# Patient Record
Sex: FEMALE | URBAN - NONMETROPOLITAN AREA
[De-identification: names, ages, dates, MRNs, and addresses within clinical notes are randomized per-mention and may not be internally consistent; named-entity substitution may affect disease eponyms.]

---

## 2021-07-06 LAB — ANTIBODY: NORMAL

## 2024-09-23 ENCOUNTER — OFFICE VISIT (OUTPATIENT)
Age: 69
End: 2024-09-23
Payer: MEDICARE

## 2024-09-23 VITALS — OXYGEN SATURATION: 93 % | DIASTOLIC BLOOD PRESSURE: 70 MMHG | SYSTOLIC BLOOD PRESSURE: 126 MMHG | HEART RATE: 100 BPM

## 2024-09-23 DIAGNOSIS — M54.2 NECK PAIN ON LEFT SIDE: Primary | ICD-10-CM

## 2024-09-23 DIAGNOSIS — J34.9 SINUS PROBLEM: ICD-10-CM

## 2024-09-23 PROCEDURE — G8421 BMI NOT CALCULATED: HCPCS | Performed by: PHYSICIAN ASSISTANT

## 2024-09-23 PROCEDURE — G8427 DOCREV CUR MEDS BY ELIG CLIN: HCPCS | Performed by: PHYSICIAN ASSISTANT

## 2024-09-23 PROCEDURE — G8400 PT W/DXA NO RESULTS DOC: HCPCS | Performed by: PHYSICIAN ASSISTANT

## 2024-09-23 PROCEDURE — 3017F COLORECTAL CA SCREEN DOC REV: CPT | Performed by: PHYSICIAN ASSISTANT

## 2024-09-23 PROCEDURE — 99204 OFFICE O/P NEW MOD 45 MIN: CPT | Performed by: PHYSICIAN ASSISTANT

## 2024-09-23 PROCEDURE — 1090F PRES/ABSN URINE INCON ASSESS: CPT | Performed by: PHYSICIAN ASSISTANT

## 2024-09-23 PROCEDURE — 1123F ACP DISCUSS/DSCN MKR DOCD: CPT | Performed by: PHYSICIAN ASSISTANT

## 2024-09-23 PROCEDURE — 4004F PT TOBACCO SCREEN RCVD TLK: CPT | Performed by: PHYSICIAN ASSISTANT

## 2024-09-23 RX ORDER — PREDNISONE 20 MG/1
20 TABLET ORAL DAILY
Qty: 5 TABLET | Refills: 0 | Status: SHIPPED | OUTPATIENT
Start: 2024-09-23 | End: 2024-09-28

## 2024-09-23 RX ORDER — METHOCARBAMOL 500 MG/1
500 TABLET, FILM COATED ORAL 3 TIMES DAILY PRN
Qty: 40 TABLET | Refills: 0 | Status: SHIPPED | OUTPATIENT
Start: 2024-09-23 | End: 2024-10-03

## 2024-09-23 ASSESSMENT — ENCOUNTER SYMPTOMS
SHORTNESS OF BREATH: 0
COUGH: 1
PHOTOPHOBIA: 0
SINUS PAIN: 1
VOMITING: 0
EYE PAIN: 0
EYE DISCHARGE: 0
SINUS PRESSURE: 1
CONSTIPATION: 0
RHINORRHEA: 0
BACK PAIN: 0
EYE REDNESS: 0
BLOOD IN STOOL: 0
ABDOMINAL PAIN: 0
SORE THROAT: 0
DIARRHEA: 0
NAUSEA: 0
WHEEZING: 0
CHEST TIGHTNESS: 0
COLOR CHANGE: 0

## 2024-09-24 ENCOUNTER — TELEPHONE (OUTPATIENT)
Age: 69
End: 2024-09-24

## 2024-09-27 ENCOUNTER — TELEPHONE (OUTPATIENT)
Age: 69
End: 2024-09-27

## 2024-09-30 ENCOUNTER — HOSPITAL ENCOUNTER (OUTPATIENT)
Dept: PHYSICAL THERAPY | Age: 69
Setting detail: THERAPIES SERIES
Discharge: HOME | End: 2024-09-30
Payer: MEDICARE

## 2024-09-30 PROCEDURE — 97162 PT EVAL MOD COMPLEX 30 MIN: CPT

## 2024-09-30 PROCEDURE — 97110 THERAPEUTIC EXERCISES: CPT

## 2024-09-30 ASSESSMENT — PAIN SCALES - GENERAL: PAINLEVEL_OUTOF10: 0

## 2024-09-30 NOTE — PROGRESS NOTES
will rotate neck to 60* to the Left without difficulty                                                    TREATMENT PLAN       Requires PT Follow-Up: Yes    Pt. actively involved in establishing Plan of Care and Goals: Yes  Patient/ Caregiver education and instruction:               Treatment may include any combination of the following:       Frequency / Duration:  Patient to be seen   for   weeks      Eval Complexity:    Decision Making: Medium Complexity  History: PF - neck DJD  Exam: NDI/ ROM  Clinical Presentation: evolving    PT Treatment Completed:      Therapy Time  Individual Time In: 1420       Individual Time Out: 1455  Minutes: 35  Timed Code Treatment Minutes: 15 Minutes     Therapist Signature: DEX Arias PT    Date: 9/30/2024     I certify that the above Therapy Services are being furnished while the patient is under my care. I agree with the treatment plan and certify that this therapy is necessary.      Physician's Signature:  ___________________________   Date:_______                                                                   Nic Ortez PA        Physician Comments: _______________________________________________    Please sign and return to [facility].  Please fax to the location listed below. THANK YOU for this referral!    [facility]  [address]  [facility] [address]  Dept: [phone]  Loc: [phone]       POC NOTE

## 2024-09-30 NOTE — PLAN OF CARE
Outpatient Physical Therapy           [place]           [] Phone: [phone]   Fax: [phone]  [place]           [x] Phone: [phone]   Fax: [phone]     To: Nic Ortez PA     From: DEX Arias PT,      Patient: Marialuisa Dailey       : 1955  Diagnosis: Neck pain on left side [M54.2] Diagnosis: neck pain  Treatment Diagnosis: neck stiffness  Date: 2024    Physical Therapy Certification/Re-Certification Form    The following patient has been evaluated for physical therapy services and for therapy to continue, insurance requires physician review of the treatment plan initially and every 90 days. Please review the attached evaluation and/or summary of the patient's plan of care, and verify that you agree therapy should continue by signing the attached document and sending it back to our office.    Assessment:    Assessment: NDI     Plan of Care/Treatment to date:  [x] Therapeutic Exercise  [] Modalities:  [x] Therapeutic Activity     [] Ultrasound  [] Electrical Stimulation  [] Gait Training      [] Cervical Traction [] Lumbar Traction  [x] Neuromuscular Re-education    [] Cold/hotpack [] Iontophoresis   [x] Instruction in HEP      [] Vasopneumatic    [] Dry Needling  [x] Manual Therapy               [] Aquatic Therapy       Other:          Frequency/Duration:  # Days per week: [] 1 day # Weeks: [] 1 week [] 5 weeks     [x] 2 days   [] 2 weeks [x] 6 weeks     [] 3 days   [] 3 weeks [] 7 weeks     [] 4 days   [] 4 weeks [] 8 weeks         [] 9 weeks [] 10 weeks         [] 11 weeks [] 12 weeks    Rehab Potential/Progress: [] Excellent [x] Good [] Fair  [] Poor     Goals:    Patient goals: learn HEP  SLong Term Goals  Time Frame for Long Term Goals: 6 weeks plan expires 24  patient will be independent with HEP  patient will score 4/50 on NDI  patient will rotate neck to 60* to the Left without difficulty     Electronically signed by:  DEX Airas PT, , 2024,

## 2024-09-30 NOTE — FLOWSHEET NOTE
towards treatment session) (Pain Rating)  Pt tolerated  treatment without any adverse reactions or complications this date. . Pt would continue to benefit from skilled therapy interventions to address remaining impairments, improve mobility and strength,  and progress toward goal completion and prepare for d/c including finalizing HEP ;  while reducing risk for re-injury or further decline.  Pt performed  all listed interventions and demonstrated adequate to good form with no reports of increased pain.  Pt reported temporary  muscle strain and fatigue during today's session..  Pain complaints after session 0/10       Plan for Next Session:        Time In / Time Out:   see eval              Timed Code/Total Treatment Minutes:  15' TE x1/ 35' includes eval      Next Progress Note due:        Plan of Care Interventions:  [x] Therapeutic Exercise  [] Modalities:  [x] Therapeutic Activity     [] Ultrasound  [] Estim  [] Gait Training      [] Cervical Traction [] Lumbar Traction  [x] Neuromuscular Re-education    [] Cold/hotpack [] Iontophoresis   [x] Instruction in HEP      [] Vasopneumatic   [] Dry Needling    [x] Manual Therapy               [] Aquatic Therapy              Electronically signed by:  DEX Arias PT, 9/30/2024, 3:43 PM

## 2024-10-03 ENCOUNTER — HOSPITAL ENCOUNTER (OUTPATIENT)
Dept: PHYSICAL THERAPY | Age: 69
Setting detail: THERAPIES SERIES
Discharge: HOME | End: 2024-10-03
Payer: MEDICARE

## 2024-10-03 PROCEDURE — 97110 THERAPEUTIC EXERCISES: CPT

## 2024-10-03 PROCEDURE — 97112 NEUROMUSCULAR REEDUCATION: CPT

## 2024-10-03 NOTE — FLOWSHEET NOTE
Communication with other providers:        Assessment:  (Response towards treatment session) (Pain Rating)  Pt tolerated  treatment without any adverse reactions or complications this date. . Pt would continue to benefit from skilled therapy interventions to address remaining impairments, improve mobility and strength,  and progress toward goal completion and prepare for d/c including finalizing HEP ;  while reducing risk for re-injury or further decline.  Pt performed  all listed interventions and demonstrated adequate to good form with no reports of increased pain.  Pt reported temporary  muscle strain and fatigue during today's session..  Pain complaints after session 0/10       Plan for Next Session:        Time In / Time Out:   1432/1500             Timed Code/Total Treatment Minutes: 28,TE x1 NRE x1      Next Progress Note due:        Plan of Care Interventions:  [x] Therapeutic Exercise  [] Modalities:  [x] Therapeutic Activity     [] Ultrasound  [] Estim  [] Gait Training      [] Cervical Traction [] Lumbar Traction  [x] Neuromuscular Re-education    [] Cold/hotpack [] Iontophoresis   [x] Instruction in HEP      [] Vasopneumatic   [] Dry Needling    [x] Manual Therapy               [] Aquatic Therapy              Electronically signed by:  DEX Arias PT, 10/3/2024, 2:39 PM

## 2024-10-07 NOTE — PRE-CERTIFICATION NOTE
Patient approved for 6 visits from 9/30/2024 to 11/11/2024.    Cpt codes approved:    31553  [id number]  [id number]  [id number]    Auth# [id number]

## 2024-10-08 ENCOUNTER — HOSPITAL ENCOUNTER (OUTPATIENT)
Dept: PHYSICAL THERAPY | Age: 69
Setting detail: THERAPIES SERIES
Discharge: HOME | End: 2024-10-08
Payer: MEDICARE

## 2024-10-08 LAB — MAMMOGRAPHY, EXTERNAL: NORMAL

## 2024-10-08 PROCEDURE — 97110 THERAPEUTIC EXERCISES: CPT

## 2024-10-08 PROCEDURE — 97140 MANUAL THERAPY 1/> REGIONS: CPT

## 2024-10-08 NOTE — FLOWSHEET NOTE
Outpatient Physical Therapy  [place]           [] Phone: [phone]   Fax: [phone]  [place]           [x] Phone: [phone]   Fax: [phone]        Physical Therapy Daily Treatment Note  Date:  10/8/2024    Patient Name:  Marialuisa Dailey    :  1955  MRN: [id number]  Restrictions/Precautions: No data recorded   Position Activity Restriction  Other position/activity restrictions: none  Diagnosis:   Neck pain on left side [M54.2] Diagnosis: neck pain  Date of Injury/Surgery:   Treatment Diagnosis:  neck stiffness  Insurance/Certification information: Medicare Advatange  Referring Physician:  Nic Ortez PA     PCP: Jona Bazzi PA-C  Next Doctor Visit:    Plan of care signed (Y/N):    Outcome Measure:   Visit# / total visits:   3/6 then PN  Pain level: 3/10   Goals:     Patient goals: learn HEP  Long Term Goals  Time Frame for Long Term Goals: 6 weeks plan expires 24  patient will be independent with HEP  patient will score 4/50 on NDI  patient will rotate neck to 60* to the Left without difficulty            Summary of Evaluation:  Assessment: NDI 9/50        Subjective:  Patient rates her pain 3/10 today.  Still having trouble sleeping at night.  Will wake up with neck pain and a HA.          Any changes in Ambulatory Summary Sheet?  None        Objective: Increased tightness noted in L UT and suboccipitals          Exercises: (No more than 4 columns)   Exercise/Equipment Date 24 Date 10/3/24 Date  10/8/2024      PT eval/HEP instruct     WARM UP         UBE -BWD 3'  5' 5 min          TABLE      Seated SNAGS AAROM with towel R/L 5 ct x 5 reps ea 5 ct x 5 reps ea   Seated UT stretch  L UT stretch only L UT stretch only  10 ct x 5  L UT stretch only  10 ct x 5    Seated chin tuck    5 ct 2 x 5 reps  5 ct 2 x 5 reps                  STANDING      LAE  YTB 3 ct 2 x15 YTB 3 ct 2 x15   Scap retraction  RTB 3 ct 2 x10 RTB 3 ct 2 x10

## 2024-10-10 ENCOUNTER — HOSPITAL ENCOUNTER (OUTPATIENT)
Dept: PHYSICAL THERAPY | Age: 69
Setting detail: THERAPIES SERIES
Discharge: HOME | End: 2024-10-10
Payer: MEDICARE

## 2024-10-10 PROCEDURE — 97112 NEUROMUSCULAR REEDUCATION: CPT

## 2024-10-10 PROCEDURE — 97110 THERAPEUTIC EXERCISES: CPT

## 2024-10-10 NOTE — FLOWSHEET NOTE
YTB 3 ct 2 x15 YTB 3 ct 2 x15 RTB 3 ct 2 x10   Scap retraction  RTB 3 ct 2 x10 RTB 3 ct 2 x10 RTB 3 ct 2 x10   Face pull    TB  3 ct 1 x10                                    PROPRIOCEPTION                                          MODALITIES                         Other Therapeutic Activities/Education:        Home Exercise Program:  SNAGS ROT AROM R/L ; L UT stretch      Manual Treatments:        Modalities:        Communication with other providers:        Assessment:  (Response towards treatment session) (Pain Rating) Pt tolerated  treatment without any adverse reactions or complications this date. . Pt would continue to benefit from skilled therapy interventions to address remaining impairments, improve mobility and strength,  and progress toward goal completion and prepare for d/c including finalizing HEP ;  while reducing risk for re-injury or further decline.  Pt performed  all listed interventions and demonstrated adequate to good form with no reports of increased pain.  Pt reported temporary  muscle strain and fatigue during today's session..  Pain complaints after session 0/10       Plan for Next Session:        Time In / Time Out:  1354/1419             Timed Code/Total Treatment Minutes: 25,TE x1 NRE x1      Next Progress Note due:        Plan of Care Interventions:  [x] Therapeutic Exercise  [] Modalities:  [x] Therapeutic Activity     [] Ultrasound  [] Estim  [] Gait Training      [] Cervical Traction [] Lumbar Traction  [x] Neuromuscular Re-education    [] Cold/hotpack [] Iontophoresis   [x] Instruction in HEP      [] Vasopneumatic   [] Dry Needling    [x] Manual Therapy               [] Aquatic Therapy              Electronically signed by:  DEX Arias PT, PTA 10/10/2024, 1:56 PM

## 2024-10-16 ENCOUNTER — HOSPITAL ENCOUNTER (OUTPATIENT)
Dept: PHYSICAL THERAPY | Age: 69
Setting detail: THERAPIES SERIES
Discharge: HOME | End: 2024-10-16
Payer: MEDICARE

## 2024-10-16 PROCEDURE — 97112 NEUROMUSCULAR REEDUCATION: CPT

## 2024-10-16 PROCEDURE — 97110 THERAPEUTIC EXERCISES: CPT

## 2024-10-16 NOTE — FLOWSHEET NOTE
Outpatient Physical Therapy  [place]           [] Phone: [phone]   Fax: [phone]  [place]           [x] Phone: [phone]   Fax: [phone]        Physical Therapy Daily Treatment Note  Date:  10/16/2024    Patient Name:  Marialuisa Dailey    :  1955  MRN: [id number]  Restrictions/Precautions: No data recorded   Position Activity Restriction  Other position/activity restrictions: none  Diagnosis:   Neck pain on left side [M54.2] Diagnosis: neck pain  Date of Injury/Surgery:   Treatment Diagnosis:  neck stiffness  Insurance/Certification information: Medicare Advatange  Referring Physician:  Nic Ortez PA     PCP: Jona Bazzi PA-C  Next Doctor Visit:    Plan of care signed (Y/N):    Outcome Measure:   Visit# / total visits:   4/ approved for 6 visits from 2024 to 2024.     Pain level: 310   Goals:     Patient goals: learn HEP  Long Term Goals  Time Frame for Long Term Goals: 6 weeks plan expires 24  patient will be independent with HEP  patient will score 4/50 on NDI  patient will rotate neck to 60* to the Left without difficulty            Summary of Evaluation:  Assessment: NDI 9/50        Subjective:  Patient states that she has not woken up with a HA for the past 3 days.  Does still have the neck stiffness        Any changes in Ambulatory Summary Sheet?  None        Objective: Good form demonstrated with exercises      Exercises: (No more than 4 columns)   Exercise/Equipment Date  10/8/2024 10/10/24 10/16/2024           WARM UP         UBE -BWD 5 min  5 min 5 min          TABLE      Seated SNAGS AAROM with towel 5 ct x 5 reps ea 5 ct 1 x10 reps 5 ct 1 x10 reps   Seated UT stretch  L UT stretch only  10 ct x 5  L UT stretch only  10 ct x 10  L UT stretch only  10 ct x 10    Seated chin tuck   5 ct 2 x 5 reps 5 ct 2 x10 reps 5 ct 2 x10 reps                  STANDING      LAE YTB 3 ct 2 x15 RTB 3 ct 2 x10 RTB 3 ct 2 x10   Scap retraction RTB

## 2024-10-24 ENCOUNTER — HOSPITAL ENCOUNTER (OUTPATIENT)
Dept: PHYSICAL THERAPY | Age: 69
Setting detail: THERAPIES SERIES
Discharge: HOME | End: 2024-10-24
Payer: MEDICARE

## 2024-10-24 PROCEDURE — 97112 NEUROMUSCULAR REEDUCATION: CPT

## 2024-10-24 PROCEDURE — 97110 THERAPEUTIC EXERCISES: CPT

## 2024-10-24 NOTE — FLOWSHEET NOTE
Outpatient Physical Therapy  [place]           [] Phone: [phone]   Fax: [phone]  [place]           [x] Phone: [phone]   Fax: [phone]        Physical Therapy Daily Treatment Note  Date:  10/24/2024    Patient Name:  Marialuisa Dailey    :  1955  MRN: [id number]  Restrictions/Precautions: No data recorded   Position Activity Restriction  Other position/activity restrictions: none  Diagnosis:   Neck pain on left side [M54.2] Diagnosis: neck pain  Date of Injury/Surgery:   Treatment Diagnosis:  neck stiffness  Insurance/Certification information: Medicare Advatange  Referring Physician:  Nic Ortez PA     PCP: Jona Bazzi PA-C  Next Doctor Visit:    Plan of care signed (Y/N):    Outcome Measure:   Visit# / total visits:   5 approved for 6 visits from 2024 to 2024.     Pain level: 3/10   Goals:     Patient goals: learn HEP  Long Term Goals  Time Frame for Long Term Goals: 6 weeks plan expires 24  patient will be independent with HEP  patient will score 4/50 on NDI  patient will rotate neck to 60* to the Left without difficulty            Summary of Evaluation:  Assessment: NDI 9/50        Subjective:  Patient states that she has not woken up with a HA for the past 10 days.  Does still have the neck stiffness        Any changes in Ambulatory Summary Sheet?  None        Objective:L neck ROT 50*; NDI 8/50      Exercises: (No more than 4 columns)   Exercise/Equipment 10/10/24 10/16/2024 20/24/24           WARM UP         UBE -BWD 5 min 5 min  5 min         TABLE      Seated SNAGS AAROM with towel 5 ct 1 x10 reps 5 ct 1 x10 reps 5 ct 1 x10 reps   Seated UT stretch  L UT stretch only  10 ct x 10  L UT stretch only  10 ct x 10  L UT stretch only  10 ct x 10    Seated chin tuck  5 ct 2 x10 reps 5 ct 2 x10 reps 5 ct 2 x10 reps                  STANDING      LAE RTB 3 ct 2 x10 RTB 3 ct 2 x10 RTB 5 ct 2 x10   Scap retraction RTB 3 ct 2 x10 RTB 3 ct 2

## 2025-02-25 ENCOUNTER — COMMUNITY OUTREACH (OUTPATIENT)
Dept: FAMILY MEDICINE CLINIC | Age: 70
End: 2025-02-25

## 2025-02-25 NOTE — PROGRESS NOTES
Patient's HM shows they are overdue for Mammogram.  Care Everywhere and  files searched.   updated with 10/8/24 Mammogram report.

## 2025-06-12 DIAGNOSIS — J45.909 MODERATE ASTHMA, UNSPECIFIED WHETHER COMPLICATED, UNSPECIFIED WHETHER PERSISTENT: Primary | ICD-10-CM

## 2025-06-12 RX ORDER — MOMETASONE FUROATE AND FORMOTEROL FUMARATE DIHYDRATE 100; 5 UG/1; UG/1
AEROSOL RESPIRATORY (INHALATION)
COMMUNITY
Start: 2025-03-12 | End: 2025-06-12 | Stop reason: SDUPTHER

## 2025-06-12 RX ORDER — MOMETASONE FUROATE AND FORMOTEROL FUMARATE DIHYDRATE 100; 5 UG/1; UG/1
1 AEROSOL RESPIRATORY (INHALATION) 2 TIMES DAILY
Qty: 1 EACH | Refills: 2 | Status: SHIPPED | OUTPATIENT
Start: 2025-06-12

## 2025-06-12 NOTE — TELEPHONE ENCOUNTER
Called and spoke with pt regarding rx refill request. Got pt scheduled with [facility] for 7/2 and routed prescription to [facility] for signing.

## 2025-07-01 SDOH — ECONOMIC STABILITY: FOOD INSECURITY: WITHIN THE PAST 12 MONTHS, YOU WORRIED THAT YOUR FOOD WOULD RUN OUT BEFORE YOU GOT MONEY TO BUY MORE.: NEVER TRUE

## 2025-07-01 SDOH — ECONOMIC STABILITY: INCOME INSECURITY: IN THE LAST 12 MONTHS, WAS THERE A TIME WHEN YOU WERE NOT ABLE TO PAY THE MORTGAGE OR RENT ON TIME?: NO

## 2025-07-01 SDOH — ECONOMIC STABILITY: FOOD INSECURITY: WITHIN THE PAST 12 MONTHS, THE FOOD YOU BOUGHT JUST DIDN'T LAST AND YOU DIDN'T HAVE MONEY TO GET MORE.: NEVER TRUE

## 2025-07-01 SDOH — ECONOMIC STABILITY: TRANSPORTATION INSECURITY
IN THE PAST 12 MONTHS, HAS THE LACK OF TRANSPORTATION KEPT YOU FROM MEDICAL APPOINTMENTS OR FROM GETTING MEDICATIONS?: NO

## 2025-07-01 SDOH — ECONOMIC STABILITY: TRANSPORTATION INSECURITY
IN THE PAST 12 MONTHS, HAS LACK OF TRANSPORTATION KEPT YOU FROM MEETINGS, WORK, OR FROM GETTING THINGS NEEDED FOR DAILY LIVING?: NO

## 2025-07-01 ASSESSMENT — PATIENT HEALTH QUESTIONNAIRE - PHQ9
SUM OF ALL RESPONSES TO PHQ QUESTIONS 1-9: 0
SUM OF ALL RESPONSES TO PHQ QUESTIONS 1-9: 0
1. LITTLE INTEREST OR PLEASURE IN DOING THINGS: NOT AT ALL
2. FEELING DOWN, DEPRESSED OR HOPELESS: NOT AT ALL
SUM OF ALL RESPONSES TO PHQ QUESTIONS 1-9: 0
1. LITTLE INTEREST OR PLEASURE IN DOING THINGS: NOT AT ALL
SUM OF ALL RESPONSES TO PHQ9 QUESTIONS 1 & 2: 0
2. FEELING DOWN, DEPRESSED OR HOPELESS: NOT AT ALL
SUM OF ALL RESPONSES TO PHQ QUESTIONS 1-9: 0

## 2025-07-02 ENCOUNTER — OFFICE VISIT (OUTPATIENT)
Dept: FAMILY MEDICINE CLINIC | Age: 70
End: 2025-07-02
Payer: MEDICARE

## 2025-07-02 VITALS
WEIGHT: 187.5 LBS | BODY MASS INDEX: 30.13 KG/M2 | HEART RATE: 60 BPM | DIASTOLIC BLOOD PRESSURE: 70 MMHG | SYSTOLIC BLOOD PRESSURE: 104 MMHG | HEIGHT: 66 IN | OXYGEN SATURATION: 95 %

## 2025-07-02 DIAGNOSIS — M50.30 DDD (DEGENERATIVE DISC DISEASE), CERVICAL: ICD-10-CM

## 2025-07-02 DIAGNOSIS — E78.2 MIXED HYPERLIPIDEMIA: Chronic | ICD-10-CM

## 2025-07-02 DIAGNOSIS — E03.9 HYPOTHYROIDISM, UNSPECIFIED TYPE: Primary | Chronic | ICD-10-CM

## 2025-07-02 DIAGNOSIS — R32 URINARY INCONTINENCE, UNSPECIFIED TYPE: ICD-10-CM

## 2025-07-02 DIAGNOSIS — R73.03 PREDIABETES: Chronic | ICD-10-CM

## 2025-07-02 PROBLEM — E66.01 MORBID OBESITY DUE TO EXCESS CALORIES (HCC): Status: ACTIVE | Noted: 2018-02-16

## 2025-07-02 PROCEDURE — 1123F ACP DISCUSS/DSCN MKR DOCD: CPT

## 2025-07-02 PROCEDURE — 1159F MED LIST DOCD IN RCRD: CPT

## 2025-07-02 PROCEDURE — 99214 OFFICE O/P EST MOD 30 MIN: CPT

## 2025-07-02 PROCEDURE — 1160F RVW MEDS BY RX/DR IN RCRD: CPT

## 2025-07-02 RX ORDER — LEVOTHYROXINE SODIUM 137 UG/1
137 TABLET ORAL DAILY
COMMUNITY
Start: 2025-04-10

## 2025-07-02 NOTE — PROGRESS NOTES
Jona Bazzi PA-C  [phone]    Marialuisa Dailey  [id number]  69 y.o.  1955    Chief Complaint:  Chief Complaint   Patient presents with    6 Month Follow-Up     6 month follow up - saw you at [facility]   Discuss some pelvic floor exercises for urinary incontinence   Neck has been bothering her with arthritis- would you recommend PT?       HPI:  Marialuisa is a 69 y.o. female, patient of Jona Bazzi PA-C, who presents today for evaluation and management of DDD, hypothyroidism, urinary incontinence. Previously seen by me at [facility].    Diffuse DDD cervical - voltaren gel, cumfree walter    Granuloma annulare - left arm    Hypothyroidism - on levothyroxine    PreDM - 10/2024 A1c 6.2. On berberine.    HLD - on omega-3 and Co-Q10. Lipids elevated. Ct cardiac scoring 769, declines statin, bad leg cramps in the past    Obesity - intermittent fasting    Labs 10/2024 showed elevated lipids, A1c 6.2    Assessment and Medical Decision Makin. Hypothyroidism, unspecified type  -     TSH; Future  -     T4, Free; Future  2. Mixed hyperlipidemia  -     Comprehensive Metabolic Panel; Future  -     LIPID PANEL; Future  3. Prediabetes  -     Comprehensive Metabolic Panel; Future  -     Hemoglobin A1C; Future  4. Urinary incontinence, unspecified type  -     [facility]  5. DDD (degenerative disc disease), cervical  -     [facility] [place]       Patient Active Problem List   Diagnosis    Hypothyroidism    Mixed hyperlipidemia    Prediabetes    Morbid obesity due to excess calories (HCC)       Social History:  Social History     Socioeconomic History    Marital status:      Spouse name: Not on file    Number of children: Not on file    Years of education: Not on file    Highest education level: Not on file   Occupational History    Not on file   Tobacco Use    Smoking status: Former     Types: Cigarettes    Smokeless tobacco: Never   Vaping Use    Vaping

## 2025-07-11 ENCOUNTER — HOSPITAL ENCOUNTER (OUTPATIENT)
Age: 70
Discharge: HOME | End: 2025-07-11
Payer: MEDICARE

## 2025-07-11 DIAGNOSIS — E78.2 MIXED HYPERLIPIDEMIA: Chronic | ICD-10-CM

## 2025-07-11 DIAGNOSIS — R73.03 PREDIABETES: Chronic | ICD-10-CM

## 2025-07-11 DIAGNOSIS — E03.9 HYPOTHYROIDISM, UNSPECIFIED TYPE: Chronic | ICD-10-CM

## 2025-07-11 LAB
ALBUMIN SERPL-MCNC: 4.2 G/DL (ref 3.4–5)
ALBUMIN/GLOB SERPL: 1.4 {RATIO} (ref 1.1–2.2)
ALP SERPL-CCNC: 95 U/L (ref 40–129)
ALT SERPL-CCNC: 10 U/L (ref 10–40)
ANION GAP SERPL CALCULATED.3IONS-SCNC: 11 MMOL/L (ref 9–17)
AST SERPL-CCNC: 21 U/L (ref 15–37)
BILIRUB SERPL-MCNC: 0.4 MG/DL (ref 0–1)
BUN SERPL-MCNC: 19 MG/DL (ref 7–20)
CALCIUM SERPL-MCNC: 9.7 MG/DL (ref 8.3–10.6)
CHLORIDE SERPL-SCNC: 104 MMOL/L (ref 99–110)
CHOLEST SERPL-MCNC: 298 MG/DL (ref 125–199)
CO2 SERPL-SCNC: 26 MMOL/L (ref 21–32)
CREAT SERPL-MCNC: 0.7 MG/DL (ref 0.6–1.2)
EST. AVERAGE GLUCOSE BLD GHB EST-MCNC: 117 MG/DL
GFR, ESTIMATED: 81 ML/MIN/1.73M2
GLUCOSE SERPL-MCNC: 99 MG/DL (ref 74–99)
HBA1C MFR BLD: 5.7 % (ref 4.2–6.3)
HDLC SERPL-MCNC: 48 MG/DL
LDLC SERPL CALC-MCNC: 221 MG/DL
POTASSIUM SERPL-SCNC: 4.8 MMOL/L (ref 3.5–5.1)
PROT SERPL-MCNC: 7.2 G/DL (ref 6.4–8.2)
SODIUM SERPL-SCNC: 140 MMOL/L (ref 136–145)
T4 FREE SERPL-MCNC: 1.4 NG/DL (ref 0.9–1.8)
TRIGL SERPL-MCNC: 145 MG/DL
TSH SERPL DL<=0.05 MIU/L-ACNC: 1.68 UIU/ML (ref 0.27–4.2)

## 2025-07-11 PROCEDURE — 80061 LIPID PANEL: CPT

## 2025-07-11 PROCEDURE — 80053 COMPREHEN METABOLIC PANEL: CPT

## 2025-07-11 PROCEDURE — 84439 ASSAY OF FREE THYROXINE: CPT

## 2025-07-11 PROCEDURE — 83036 HEMOGLOBIN GLYCOSYLATED A1C: CPT

## 2025-07-11 PROCEDURE — 84443 ASSAY THYROID STIM HORMONE: CPT

## 2025-07-15 ENCOUNTER — RESULTS FOLLOW-UP (OUTPATIENT)
Dept: FAMILY MEDICINE CLINIC | Age: 70
End: 2025-07-15

## 2025-07-15 DIAGNOSIS — J45.909 MODERATE ASTHMA, UNSPECIFIED WHETHER COMPLICATED, UNSPECIFIED WHETHER PERSISTENT: ICD-10-CM

## 2025-07-15 DIAGNOSIS — E03.9 ACQUIRED HYPOTHYROIDISM: Primary | Chronic | ICD-10-CM

## 2025-07-22 RX ORDER — LEVOTHYROXINE SODIUM 137 UG/1
137 TABLET ORAL DAILY
Qty: 90 TABLET | Refills: 1 | Status: SHIPPED | OUTPATIENT
Start: 2025-07-22

## 2025-07-22 RX ORDER — MOMETASONE FUROATE AND FORMOTEROL FUMARATE DIHYDRATE 100; 5 UG/1; UG/1
1 AEROSOL RESPIRATORY (INHALATION) 2 TIMES DAILY
Qty: 1 EACH | Refills: 5 | Status: SHIPPED | OUTPATIENT
Start: 2025-07-22